# Patient Record
(demographics unavailable — no encounter records)

---

## 2018-04-07 NOTE — HP
CHIEF COMPLAINT:  Chest pain.

 

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  He was in his usual state of health today after a brief walk 
in the morning at around 10:30 along with his wife, he came back and was sweating and had 3 cups of c
offee, which is a regular routine for him and at around 11:30, he felt a sudden onset of chest tightn
ess associated with palpitations in the left precordium.  The chest pain was not very significant acc
ording to him, but the palpitations were persistent, which he did not experience this in the past and
 has no past medical history, no history of hypertension, no history of coronary artery disease, but 
he did feel such palpitations very briefly in the past, which were not associated with any chest tigh
tness.  So, the patient during this episode had perspiration, had also some nausea and he checked on 
his sports watch and it showed a heart rate of more than 180.  So, he decided to come to the ER for f
urther evaluation.  When the patient arrived in the ER, he was alert and oriented.  His heart rate wa
s in 190s, so EKG was ordered, which showed an evidence of a supraventricular tachycardia and the pat
ient was given 6 mg of adenosine through IV sudden push and slow push following which a repeat EKG wa
s done, which showed a persistent tachycardia at 189.  The patient did not have chest pains.  He had 
a lab work done in the ER, which initially did not reveal any troponin elevation initially at 0.010. 
 It did not reveal any evidence of troponin elevation initially, so another 12 mg of adenosine was gi
catrachita per ACLS protocol, which did respond and heart rate did come down to 90s and the patient's rhythm
 did change to sinus rhythm with normal P waves, but there was no evidence of T-wave inversions or ST
 segment depressions in lateral leads.  The patient had no previous cardiac evaluation in the past.

 

PAST MEDICAL HISTORY:  None.

 

PAST SURGICAL HISTORY:  The patient had minor surgeries to his lower extremities in terms of meniscal
 repair.  Otherwise, no major surgeries.

 

SOCIAL HISTORY:  The patient is not a nonsmoker.  He does drink alcohol very occasionally.  He does d
rink beer.  No history of any illicit drug use.  He drinks a lot of coffee, more than 3-4 cups a day 
and does drink occasionally diet Cokes.

 

FAMILY HISTORY:  No significant family history of coronary artery disease or any premature deaths in 
the family were noted.

 

ALLERGIES:  No known drug allergies.

 

REVIEW OF SYSTEMS:  All 12 systems reviewed with the patient thoroughly and found to be negative at t
his time.  Systems reviewed are HEENT, CVS, CNS, respiratory, GI, , musculoskeletal.

The following complete review of systems was negative, unless otherwise mentioned in the HPI or below
:

Constitutional:  Weight loss or gain, sense of well-being, ability to conduct usual activities, exerc
ise tolerance.

Skin/Breast:  Rash, itching, changes in hair growth or loss, nail changes, breast lumps, tenderness, 
swelling, nipple discharge.

Eyes:  Vision, double vision, tearing, blind spots, pain.

ENT/Mouth:  Headaches (location, time of onset, duration, precipitating factors), vertigo, lightheade
dness, injury. Vision, double vision, tearing, blind spots, pain, nose bleeding, colds, obstruction, 
discharge, dental difficulties, gingival bleeding, dentures, neck stiffness, pain, tenderness, masses
 in thyroid or other areas

Cardiovascular:  Precordial pain, substernal distress, palpitations, syncope, dyspnea on exertion, or
thopnea, nocturnal paroxysmal dyspnea, edema, cyanosis, hypertension, heart murmurs, varicosities, ph
lebitis, claudication.

Respiratory:  Pain, shortness of breath, wheezing, stridor, cough, hemoptysis, fever or night

sweats.

Gastrointestinal:  Poor appetite, dysphagia, indigestion, abdominal pain, heartburn, eructation, naus
ea, vomiting, hematemesis, jaundice, constipation, or diarrhea, abnormal stools (jeovanny-colored, tarry,
 bloody, greasy, foul smelling), flatulence, hemorrhoids, recent changes in bowel habits.

Genitourinary:  Urgency, frequency, dysuria, nocturia, hematuria, polyuria, oliguria, unusual (or kelsi
nge in) color of urine, stones, hesitancy, change in size of stream, dribbling, acute retention or in
continence, libido, potency.

Musculoskeletal:  Pain, swelling, redness or heat of muscles or joints, limitation, of motion, muscul
ar weakness, atrophy, cramps.

Neurologic/Psychiatric:  Convulsions, paralyses, tremor, incoordination, paresthesias, difficulties w
ith memory of speech, sensory or motor disturbances, or muscular coordination (ataxia, tremor), emoti
onal problems, anxiety, depression, previous psychiatric care, unusual perceptions, hallucinations.

Allergy/Immunologic:  Skin rash, anemia, bleeding tendency, polydipsia, polyuria, intolerance to heat
 or cold.

 

PHYSICAL EXAMINATION:

VITAL SIGNS:  Blood pressure is 110/80, heart rate is 93, respirations 18 and saturation is 98% on ro
om air.

GENERAL:  The patient is moderately built and moderately nourished.  He does not appear to be in acut
e distress at this time.  He is alert and oriented x3.

HEENT:  Atraumatic and normocephalic.  PERRLA.  Extraocular movements were intact.  Oral mucosa is pi
nk and moist.

CARDIOVASCULAR:  S1 and S2 normal.  No murmurs, rubs or gallops.

LUNGS:  Bilateral air entry was equal.  No wheezing, no crackles.

ABDOMEN:  Soft and nontender.  No guarding, no rebound tenderness.  Bowel sounds normal.

MUSCULOSKELETAL:  No calf tenderness.  No pedal edema.  No joint tenderness, no joint swelling.

SKIN:  No cyanosis, no erythema, no rash, no pallor.

CENTRAL NERVOUS SYSTEM:  Cranial nerve examination II-XII intact.  No focal deficits were noted.

NECK:  No thyromegaly, no JVD.

PSYCHIATRIC:  No signs of suicidal ideation, no signs of vale, no signs of depression was noted.

 

LABORATORY DATA:  Sodium 141, potassium 4.2, chloride 105, BUN 17, creatinine 0.96, blood sugar is 64
 and troponin 0.010.  WBC 8.2, hemoglobin 17.1, hematocrit is 48.8 and platelets 209.

 

ASSESSMENT:

1.  Acute coronary syndrome.

2.  Acute supraventricular tachycardia.

3.  Moderate dehydration.

4.  High caffeine intake.

 

PLAN:  The plan is to monitor this patient on the telemetry floor and we will do serial troponin q.6 
hours.  The initial troponin was normal waiting on the repeat troponin.

1.  We will start the patient on Coreg 3.125 mg p.o. b.i.d. and a statin with atorvastatin 40 mg p.o.
 daily.  We will get a 2D echo to look for any evidence of wall motion abnormality or any valvular ab
normalities contributing to the present palpitations.  We will also get a TSH and T4 to rule out any 
metabolic reasons for the rapid heart rate.

2.  The patient will also get a nuclear stress test as long as the troponins remain normal to rule ou
t coronary syndrome.

3.  The patient requesting Cardiology, we will have Cardiology also to see the patient because of the
 supraventricular tachycardia and the chest pain.

4.  The patient is a heavy caffeine drinker.  Counseled the patient to cut down on the caffeine to th
e moderate drinks a day at least 1-2 cups.

5.  Moderate dehydration.  I have encouraged the patient to drink more fluids.  At this time, we will
 not start him on any IV fluids at this time unless the pressures are dropping.

6.  Deep venous thrombosis prophylaxis.  Lovenox 40 mg subcu daily.

 

I spent 75 minutes on this patient.

## 2018-04-07 NOTE — RAD
RADIOGRAPH CHEST 1 VIEW:

Supine

 

HISTORY: 

55-year-old male with palpitations and tachycardia. 

 

FINDINGS:

There is no air space density or pulmonary edema.  The lateral costophrenic angles are sharp. Supine 
positioning makes this study insensitive for pneumothorax detection. There is no cardiomegaly. There 
is a defibrillation paddle over the lateral aspect of the right chest. 

 

IMPRESSION:

No acute pulmonary findings.

 

 

aranza naidu

 

POS: GRABIEL

## 2018-04-08 NOTE — NM
MYOCARDIAL PERFUSION EVALUATION:

 

CLINICAL HISTORY:  

55-year-old male with chest pain and SVT. 

 

Reference cardiology report for details regarding stress EKG portion of exam. 

 

FINDINGS:

Evaluation of stress and rest imaging reveals no significant fixed or reversible defects. Gated imagi
ng reveals wall motion and contractility with calculated ejection fraction at 71%. 

 

IMPRESSION: 

1.  No scintigraphic evidence to indicate significant ischemia or scar. 

2.  Normal left ventricular systolic function is demonstrated. 

 

 

 

POS: GRABIEL

## 2018-04-08 NOTE — DIS
DATE OF ADMISSION:  04/07/2018

 

DATE OF DISCHARGE:  04/08/2018

 

ADMITTING DIAGNOSIS:  Acute supraventricular tachycardia.

 

DISCHARGE DIAGNOSIS:  Acute supraventricular tachycardia.

 

SECONDARY DIAGNOSES:

1.  Anxiety disorder.

2.  Acute coronary syndrome.

3.  Non-ST elevation myocardial infarction.

4.  Moderate dehydration.

 

CONSULTANTS:  Consultants involved in this care is Dr. Kim from Cardiology.

 

INVESTIGATIONS DONE DURING THIS ADMISSION:  Exercise nuclear stress test and 2D echo, both showing ej
ection fraction more than 55% and nuclear stress test was negative for any acute cardiac ischemia or 
any evidence of coronary artery disease.

 

HISTORY OF PRESENT ILLNESS AND HOSPITAL COURSE:  In brief, this is a 55-year-old white male with a kn
own history of high stressful job and with a high caffeine intake.  He presented to the ER complainin
g of palpitations in the left precordium associated with some chest pressure.  The palpitation lasted
 a little longer than usually it used to be, and so he decided to come to the hospital and was found 
to have a heart rate of 189.  He was initially given 6 mg of adenosine with not much help and another
 12 mg was given per ACLS protocol and it did drop the heart rate down to 90.  The patient was back t
o sinus rhythm.  Following this, adenosine infusion and the patient was monitored closely.  The patie
nt initially had a normal troponin, but later on his troponins did went up to 0.7 and was trending do
wn to 0.04.  So, Cardiology was consulted and advised to follow up with Electrophysiology as outpatie
nt for possible ablation.  The patient had an unremarkable night.  While in the hospital, he had a nu
clear stress test the following morning, which came back negative with no evidence of any cardiac isc
hemia.  A 2D echo was also done, which showed a normal EF of 55% and a stress test was showing EF of 
above 70%.  The patient was reassured and advised to continue the Coreg from which he felt he was fee
ling very tired and weak.  The patient was closely monitored, did not have any further symptoms and w
as discharged home in stable condition.

 

PHYSICAL EXAMINATION:  On date of discharge:

VITAL SIGNS:  Blood pressures were 113/76, heart rate is 75, respiratory rate 16, saturation 93%.

GENERAL:  The patient is moderately built, moderately nourished.  He does not appear to be in acute d
istress at this time.  Alert and oriented x3.

HEENT:  Atraumatic, normocephalic, PERRLA.  Extraocular movements are intact.  Oral mucosa is pink an
d moist.

CARDIOVASCULAR:  S1, S2 normal.  No murmurs, rubs or gallops.

LUNGS:  Bilateral air entry was equal.  No wheezing, no crackles.

ABDOMEN:  Soft, nontender.  No guarding or rebound tenderness.  Bowel sounds normal.

MUSCULOSKELETAL:  No calf tenderness.  No pedal edema, no joint tenderness, no joint swelling.

SKIN:  No cyanosis, no erythema, no rash, no pallor.

 

HOME MEDICATIONS:

1.  Alprazolam 0.25 mg p.o. daily at bedtime.

2.  Atorvastatin 10 mg p.o. at bedtime.

3.  Cetirizine 10 mg p.o. daily.

4.  Fluticasone 1 spray nasally daily.

5.  Sertraline 100 mg p.o. at bedtime.

6.  Valacyclovir 1000 mg p.o. daily.

 

DISCHARGE MEDICATIONS:  New medications are:

1.  Aspirin 81 mg daily.

2.  Coreg 3.125 mg p.o. b.i.d.

 

DISCHARGE INSTRUCTIONS:  Continue activity as tolerated.  Advised to cut down the caffeine intake.  A
dvised to continue on the physical activity.  Advised to follow up with electrophysiology in 1 to 2 w
eeks.  Advised to follow up with the primary care physician in 1 week.  Advised follow up with Dr. Katelyn roper in 2 weeks.

 

The patient is advised to continue on a heart healthy diet.  The patient is to be discharged home in 
stable condition.

 

I spent 35 minutes with this patient on the day of discharge.

## 2018-04-08 NOTE — PDOC.CTH
<Dafne Beltran - Last Filed: 04/08/18 09:51>





Cardiology Progress Note





- Subjective





The pt seen and examined.  No overnight events.  No cardiac complaints.  He 

walks and bikes.  





- Objective


 Vital Signs











  Temp Pulse Resp BP Pulse Ox


 


 04/08/18 04:16  97.8 F  58 L  14  108/64  96








 











Weight                         223 lb 14.4 oz














 











 04/07/18 04/08/18 04/09/18





 06:59 06:59 06:59


 


Intake Total  470 


 


Balance  470 














- Physical Examination


General/Neuro: alert & oriented x3


Neck: no JVD present


Lungs: CTA


Heart: RRR


Abdomen: soft


Extremities: other: (No  edema)





- Telemetry


Telemetry Rhythm: SR





- Labs


Result Diagrams: 


 04/08/18 04:06





 04/08/18 04:06


 Troponin/CKMB











CK-MB (CK-2)  1.3 ng/mL (0-6.6)   04/07/18  12:33    


 


Troponin I  0.074 ng/mL (< 0.028)  H  04/07/18  18:33    














- Assessment/Plan





1. SVT - No recurrent episodes during this admission; Remains in SR with Coreg 

3.125mg BID and ASA 81mg daily; possible EP referral as outpt for possible SVT 

ablation.


2. Hyperlipidemia - on Statin


3. DENISE - Cpap at [facility] reviewed 





Review of Systems





- Review of Systems


Constitutional: reports: no symptoms reported


EENTM: reports: no symptoms reported


Respiratory: reports: no symptoms reported


Cardiac (ROS): reports: no symptoms reported


ABD/GI: reports: no symptoms reported


: reports: no symptoms reported


Musculoskeletal: reports: no symptoms reported





<VIVIAN Kim - Last Filed: 04/08/18 14:32>





Cardiology Progress Note





- Objective


 Vital Signs











  Temp Pulse Resp BP BP BP BP


 


 04/08/18 13:21      113/76  105/71  109/66


 


 04/08/18 11:05  98.1 F  75  16  111/64   


 


 04/08/18 08:00  97.8 F  58 L  14    


 


 04/08/18 04:16  97.8 F  58 L  14  108/64   














  Pulse Ox


 


 04/08/18 13:21 


 


 04/08/18 11:05  93 L


 


 04/08/18 08:00 


 


 04/08/18 04:16  96








 











Weight                         223 lb 14.4 oz














 











 04/07/18 04/08/18 04/09/18





 06:59 06:59 06:59


 


Intake Total  470 


 


Balance  470 














- Labs


Result Diagrams: 


 04/08/18 04:06





 04/08/18 04:06


 Troponin/CKMB











CK-MB (CK-2)  1.3 ng/mL (0-6.6)   04/07/18  12:33    


 


Troponin I  0.044 ng/mL (< 0.028)  H  04/08/18  04:06    














- Assessment/Plan





Pt. seen and eval. by me. I agree with the A/P by the NP. The stress test was 

normal. The echo is unremarkable with a normal EF. He will get an event monitor 

from my office, follow up in 1 month,continue low dose coreg and have a 

referral to EP.

## 2018-04-08 NOTE — CON
DATE OF CONSULTATION:  04/07/2018

 

INDICATION FOR CONSULTATION:  A 55-year-old patient with sudden onset of supraventricular tachycardia
.

 

HISTORY OF PRESENT ILLNESS:  This very pleasant 55-year-old gentleman has had some episodes of likely
 short runs of SVT in the past.  It may occur once every 3-4 months, used to last about 20-30 seconds
.  Today, he had an episode while he was sitting at his computer which lasted about 2 hours, it start
ed around 11:45.  He presented to the emergency room within an hour's time.  He had noticed that he h
as been having about 3 or 4 cups of coffee a day which is not unusual for him, but his has been on in
creased stress, was answering stressful e-mails but he has had no other significant triggering factor
s that might be the cause of the supraventricular when he arrived in the emergency room, his heart ra
te was approximately 200 beats per minute.  He received adenosine and then converted back to sinus rh
ythm.  Since that time, he has remained in sinus rhythm.  He did have some nonspecific ST segment kelsi
nges with the rapid heart rate which would not be unusual actually with a heart rate as fast as he ha
d and his heart rate was by  beats per minute.  He did have some slight ST segment depression 
in the lateral leads as well as in V3 through V5.  After he had converted back to sinus rhythm, the r
epeat EKG does not show any acute changes.  The EKG was present.  He did have the ST segment changes 
resolved, but he did have decreased R-wave progression in V1 and V2, and we will continue to follow t
his.  His cardiac enzymes were slightly abnormal; however, this would not be unusual with a rapid hea
rt rate of 200 beats per minute.  Troponin I went from 0.01-0.05.  He has had no previous cardiac his
tory.  Interestingly enough, he does have family history of a one sister with increased heart rate, w
hich was felt to be due to anxiety and also his mother has increased heart rate, which also he is fel
t to be due to anxiety, but this actually may also be some form of arrhythmia is uncertain.  Otherwis
e, he is doing quite well at this time and denies any complaints or symptoms.

 

PAST MEDICAL HISTORY:  Significant for the episodes of palpitations.  He has had a left ACL repair on
 the left.  He has had a right meniscus tear.  He has had broken the right thumb.  He has had sleep a
pnea.  He has history of sleep apnea, uses CPAP mask.  He had a hernia repair.

 

SOCIAL HISTORY:  He is .  He has two adopted children.  He has occasional alcohol use, but not
marko significant.  He works at the [employer] [place] as .

 

HOME MEDICATIONS:  Include alprazolam, cetirizine, atorvastatin, valacyclovir, fluticasone and sertra
line.  In the emergency room, he was given aspirin and adenosine.  At first, he was given 6 mg and th
en he was given 12 mg prior to converting, he also was started on IV saline drip.

 

REVIEW OF SYSTEMS:  He had no significant complaints on the review of systems except for occasional d
izziness associated with rapid heart rate today and also some nausea.  He has complaints of sleep apn
ea.  Otherwise, his 12-point review of systems is unremarkable.  He denied any new HEENT complaints, 
visual changes, hearing loss or tinnitus.  No pulmonary complaints such as asthma, emphysema, bronchi
tis.  No GI complaints such as nausea, vomiting or diarrhea which are chronic.  No  complaints of d
ysuria, polyuria, or hematuria.  No musculoskeletal complaints.  No neurological complaints of seizur
es or syncope.

 

PHYSICAL EXAMINATION:

GENERAL:  Reveals a very pleasant, well-developed, well-developed, well-nourished gentleman.

VITAL SIGNS:  Blood pressure 124/75, heart rate is 71 and regular, temperature afebrile, respiratory 
rate is 20.

HEENT:  Shows the head to be normocephalic and atraumatic.  Carotid pulses are present.  There were n
o bruits.  There is no JVD.  The thyroids are not enlarged.

CHEST:  Clear to auscultation without rales, rhonchi or wheezing.

CARDIOVASCULAR:  Exam reveals a regular rate and rhythm, normal S1, S2.  There is no S3, S4.  There w
ere no significant murmurs, heaves, thrills, bruits or rubs.

ABDOMEN:  Soft and nontender.  Positive bowel sounds are present.  No organomegaly or masses are note
d.  Femoral pulses are present.

EXTREMITIES:  Showed no clubbing, cyanosis, edema.  Pedal pulses are present.

NEUROLOGIC:  The patient appears to be fully intact.  Psychologically, he also appears to be intact.

 

EKG shows at this time a normal sinus rhythm with no acute changes.  Decreased R-wave progression in 
V1 and V2.

 

LABORATORY DATA:  Shows elevated hemoglobin and hematocrit.  Hemoglobin is elevated slightly on the h
igh side at 17.1 with the hematocrit 48.8 and uncertain etiology, he may be slightly dehydrated, but 
BUN and creatinine are normal at 17 and 0.96.  His thyroid function was evaluated and this is also wi
thin normal limits.

 

IMPRESSION:

1.  Supraventricular tachycardia which occurred today without provocation except from perhaps increas
ed fatigue, stress, and caffeine use.  He was converted back to sinus rhythm with adenosine.  At this
 time, we will schedule him for an echocardiogram as well as stress testing.  The stress test may nee
d to rule out evidence for underlying ischemia due to the EKG changes earlier today and a slight incr
ease in the cardiac enzymes which would not be unexpected.  We would advise starting him on a low dos
e of medications at this time.  He certainly can visit with his electrophysiologist as an outpatient 
as an option and can undergo an ablation of the SVT if he so desires.

2.  History of sleep apnea.  He should continue on CPAP mask.

 

We will try medications and then most likely he will need to be referred as an outpatient to the [facility]
trophysiologist for possible ablation if he so desires.  He has not had an episode this significant i
n the past and I will advise him, also decrease his caffeine intake.

## 2018-04-10 NOTE — STRESS
Acquisition Time: 2018-04-08  09:38:07

Total Exercise Time: 00:09:01

Test Indications: CHEST PAIN

Medications: 

 

 

 

 

 

 

 

 

 

Protocol:     JAUN

Max HR: 141 BPM  85% of  Pred: 165 BPM

Max BP: 144/070 mmHG

Max Work Load: 10.4 METS

 

RESTING ECG: NORMAL SINUS RHYTHM AT 63 BPM

SYMPTOMS: NONE

NORAML BP RESPONSE

ECTOPY: NONE

ECG STRESS: NO SIGNIFICANT CHANGES

INTERPRETATION: AWAIT NUCLEAR IMAGES FOR DEFINITIVE DIAGNOSIS

 

Confirmed by YADIRA WHITING ([id number]) on 4/10/2018 1:35:09 PM

 

Referred By: NEGRITO WHITING           Confirmed By:YADIRA WHITING

## 2018-05-22 NOTE — EKG
Test Reason : 

Blood Pressure : ***/*** mmHG

Vent. Rate : 186 BPM     Atrial Rate : 441 BPM

   P-R Int : 000 ms          QRS Dur : 080 ms

    QT Int : 250 ms       P-R-T Axes : 000 -15 081 degrees

   QTc Int : 440 ms

 

Supraventricular tachycardia

Marked ST abnormality, possible lateral subendocardial injury

Abnormal ECG

 

Confirmed by OLIVIA BANKS, BLAIRE ([id number]),  JEFF LOZA ([id number]) on 5/22/2018 12:45:24 PM

 

Referred By:             Confirmed By:BLAIRE BAKER MD

## 2018-05-23 NOTE — EKG
Test Reason : 

Blood Pressure : ***/*** mmHG

Vent. Rate : 185 BPM     Atrial Rate : 020 BPM

   P-R Int : 000 ms          QRS Dur : 068 ms

    QT Int : 254 ms       P-R-T Axes : 000 -16 149 degrees

   QTc Int : 445 ms

 

Supraventricular tachycardia

Marked ST abnormality, possible anterolateral subendocardial injury

Abnormal ECG

 

Confirmed by OLIVIA BANKS, BLAIRE ([id number]),  JEFF LOZA ([id number]) on 5/23/2018 1:40:55 PM

 

Referred By:             Confirmed By:BLAIRE BAKER MD